# Patient Record
Sex: FEMALE | Race: BLACK OR AFRICAN AMERICAN | Employment: FULL TIME | URBAN - METROPOLITAN AREA
[De-identification: names, ages, dates, MRNs, and addresses within clinical notes are randomized per-mention and may not be internally consistent; named-entity substitution may affect disease eponyms.]

---

## 2019-01-03 ENCOUNTER — HOSPITAL ENCOUNTER (EMERGENCY)
Facility: HOSPITAL | Age: 44
Discharge: HOME | End: 2019-01-04
Attending: EMERGENCY MEDICINE
Payer: COMMERCIAL
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
## 2019-01-03 DIAGNOSIS — S13.4XXA WHIPLASH INJURY TO NECK, INITIAL ENCOUNTER: ICD-10-CM

## 2019-01-03 PROCEDURE — 99283 EMERGENCY DEPT VISIT LOW MDM: CPT

## 2019-01-03 RX ORDER — LISINOPRIL 2.5 MG/1
2.5 TABLET ORAL DAILY
COMMUNITY

## 2019-01-03 RX ORDER — SIMVASTATIN 20 MG
20 TABLET ORAL NIGHTLY
COMMUNITY

## 2019-01-04 VITALS
OXYGEN SATURATION: 99 % | SYSTOLIC BLOOD PRESSURE: 111 MMHG | HEART RATE: 78 BPM | TEMPERATURE: 98 F | DIASTOLIC BLOOD PRESSURE: 62 MMHG | WEIGHT: 175 LBS | HEIGHT: 66 IN | RESPIRATION RATE: 14 BRPM | BODY MASS INDEX: 28.13 KG/M2

## 2019-01-04 RX ORDER — LIDOCAINE 50 MG/G
1 PATCH TOPICAL EVERY 24 HOURS
Qty: 7 PATCH | Refills: 0 | Status: SHIPPED | OUTPATIENT
Start: 2019-01-04 | End: 2019-01-11

## 2019-01-04 RX ORDER — ORPHENADRINE CITRATE 100 MG/1
100 TABLET, EXTENDED RELEASE ORAL 2 TIMES DAILY
Qty: 20 TABLET | Refills: 0 | Status: SHIPPED | OUTPATIENT
Start: 2019-01-04 | End: 2019-01-14

## 2019-01-04 RX ORDER — LIDOCAINE 50 MG/G
1 PATCH TOPICAL ONCE
Status: DISCONTINUED | OUTPATIENT
Start: 2019-01-04 | End: 2019-01-04 | Stop reason: ALTCHOICE
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
## 2019-01-04 NOTE — ED NOTES
Assumed care of ambulatory female patient from triage. A&Ox4. See triage note and nursing assessment. Patient does not have any pain on palpation to cervical spine. All extremity strength and tone equal. No LOC. No headache. No vomiting.  Awaiting MD to see
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
## 2022-04-20 ENCOUNTER — TELEPHONE (OUTPATIENT)
Dept: FAMILY MEDICINE CLINIC | Facility: CLINIC | Age: 47
End: 2022-04-20

## 2022-04-20 NOTE — TELEPHONE ENCOUNTER
Advised patient to contact old prescriber for refills until she can establish care. She said she would. Please call patient back to schedule appointment.

## 2022-04-20 NOTE — TELEPHONE ENCOUNTER
This patient is trying to establish care with Dr. Feroz Arreguin she is a diabetic and is out of medication. Dr. Feroz Arreguin has no openings.

## 2022-05-12 ENCOUNTER — OFFICE VISIT (OUTPATIENT)
Dept: FAMILY MEDICINE CLINIC | Facility: CLINIC | Age: 47
End: 2022-05-12
Payer: COMMERCIAL

## 2022-05-12 ENCOUNTER — TELEPHONE (OUTPATIENT)
Dept: FAMILY MEDICINE CLINIC | Facility: CLINIC | Age: 47
End: 2022-05-12

## 2022-05-12 VITALS
TEMPERATURE: 98 F | DIASTOLIC BLOOD PRESSURE: 68 MMHG | OXYGEN SATURATION: 99 % | WEIGHT: 189 LBS | BODY MASS INDEX: 30.02 KG/M2 | RESPIRATION RATE: 18 BRPM | HEART RATE: 78 BPM | SYSTOLIC BLOOD PRESSURE: 98 MMHG | HEIGHT: 66.5 IN

## 2022-05-12 DIAGNOSIS — N30.00 ACUTE CYSTITIS WITHOUT HEMATURIA: ICD-10-CM

## 2022-05-12 DIAGNOSIS — Z12.31 ENCOUNTER FOR SCREENING MAMMOGRAM FOR MALIGNANT NEOPLASM OF BREAST: ICD-10-CM

## 2022-05-12 DIAGNOSIS — Z79.4 TYPE 2 DIABETES MELLITUS WITH HYPERGLYCEMIA, WITH LONG-TERM CURRENT USE OF INSULIN (HCC): ICD-10-CM

## 2022-05-12 DIAGNOSIS — Z12.11 SCREENING FOR COLON CANCER: ICD-10-CM

## 2022-05-12 DIAGNOSIS — N95.0 POSTMENOPAUSAL BLEEDING: ICD-10-CM

## 2022-05-12 DIAGNOSIS — Z13.0 SCREENING, ANEMIA, DEFICIENCY, IRON: ICD-10-CM

## 2022-05-12 DIAGNOSIS — Z13.29 SCREENING FOR THYROID DISORDER: ICD-10-CM

## 2022-05-12 DIAGNOSIS — Z11.59 ENCOUNTER FOR HEPATITIS C SCREENING TEST FOR LOW RISK PATIENT: ICD-10-CM

## 2022-05-12 DIAGNOSIS — I10 ESSENTIAL HYPERTENSION, BENIGN: ICD-10-CM

## 2022-05-12 DIAGNOSIS — R82.90 CLOUDY URINE: ICD-10-CM

## 2022-05-12 DIAGNOSIS — E11.65 TYPE 2 DIABETES MELLITUS WITH HYPERGLYCEMIA, WITH LONG-TERM CURRENT USE OF INSULIN (HCC): ICD-10-CM

## 2022-05-12 DIAGNOSIS — Z00.00 WELLNESS EXAMINATION: Primary | ICD-10-CM

## 2022-05-12 PROBLEM — K21.9 GASTROESOPHAGEAL REFLUX DISEASE WITHOUT ESOPHAGITIS: Status: ACTIVE | Noted: 2022-05-12

## 2022-05-12 LAB
BILIRUBIN: NEGATIVE
CREAT UR-SCNC: 211 MG/DL
GLUCOSE (URINE DIPSTICK): NEGATIVE MG/DL
KETONES (URINE DIPSTICK): NEGATIVE MG/DL
MICROALBUMIN UR-MCNC: 11.9 MG/DL
MICROALBUMIN/CREAT 24H UR-RTO: 56.4 UG/MG (ref ?–30)
MULTISTIX LOT#: ABNORMAL NUMERIC
NITRITE, URINE: POSITIVE
PH, URINE: 6 (ref 4.5–8)
PROTEIN (URINE DIPSTICK): 30 MG/DL
SPECIFIC GRAVITY: 1.02 (ref 1–1.03)
URINE-COLOR: YELLOW
UROBILINOGEN,SEMI-QN: 0.2 MG/DL (ref 0–1.9)

## 2022-05-12 PROCEDURE — 87077 CULTURE AEROBIC IDENTIFY: CPT | Performed by: FAMILY MEDICINE

## 2022-05-12 PROCEDURE — 3078F DIAST BP <80 MM HG: CPT | Performed by: FAMILY MEDICINE

## 2022-05-12 PROCEDURE — 82570 ASSAY OF URINE CREATININE: CPT | Performed by: FAMILY MEDICINE

## 2022-05-12 PROCEDURE — 87086 URINE CULTURE/COLONY COUNT: CPT | Performed by: FAMILY MEDICINE

## 2022-05-12 PROCEDURE — 99386 PREV VISIT NEW AGE 40-64: CPT | Performed by: FAMILY MEDICINE

## 2022-05-12 PROCEDURE — 99204 OFFICE O/P NEW MOD 45 MIN: CPT | Performed by: FAMILY MEDICINE

## 2022-05-12 PROCEDURE — 82043 UR ALBUMIN QUANTITATIVE: CPT | Performed by: FAMILY MEDICINE

## 2022-05-12 PROCEDURE — 81003 URINALYSIS AUTO W/O SCOPE: CPT | Performed by: FAMILY MEDICINE

## 2022-05-12 PROCEDURE — 3060F POS MICROALBUMINURIA REV: CPT | Performed by: FAMILY MEDICINE

## 2022-05-12 PROCEDURE — 3074F SYST BP LT 130 MM HG: CPT | Performed by: FAMILY MEDICINE

## 2022-05-12 PROCEDURE — 3008F BODY MASS INDEX DOCD: CPT | Performed by: FAMILY MEDICINE

## 2022-05-12 PROCEDURE — 87186 SC STD MICRODIL/AGAR DIL: CPT | Performed by: FAMILY MEDICINE

## 2022-05-12 RX ORDER — SIMVASTATIN 20 MG
20 TABLET ORAL NIGHTLY
Qty: 90 TABLET | Refills: 0 | Status: SHIPPED | OUTPATIENT
Start: 2022-05-12

## 2022-05-12 RX ORDER — NITROFURANTOIN 25; 75 MG/1; MG/1
100 CAPSULE ORAL 2 TIMES DAILY
Qty: 10 CAPSULE | Refills: 0 | Status: SHIPPED | OUTPATIENT
Start: 2022-05-12

## 2022-05-12 RX ORDER — LISINOPRIL 2.5 MG/1
2.5 TABLET ORAL DAILY
Qty: 90 TABLET | Refills: 0 | Status: SHIPPED | OUTPATIENT
Start: 2022-05-12

## 2022-05-12 RX ORDER — INSULIN GLARGINE 100 [IU]/ML
50 INJECTION, SOLUTION SUBCUTANEOUS NIGHTLY
Qty: 45 ML | Refills: 0 | Status: SHIPPED | OUTPATIENT
Start: 2022-05-12

## 2022-05-12 NOTE — PATIENT INSTRUCTIONS
Take the Macrobid 100 mg one capsule twice daily for 5 days. Drink 64 ounces of water daily. Limit caffeine intake. Empty your bladder after sex. No bubble baths, hot tubs, swimming while symptomatic. A urine culture was sent and we will contact you if any adjustments in your medication needs to be done based on the culture results. Otherwise, take all your medications until completed. Continue all your other medications as prescribed. Go for your fasting blood tests. Do not eat or drink except for water for at least 8 hours prior to the blood tests. Schedule your mammogram as ordered. Schedule your pelvic ultrasound. The Cologuard test will be mailed to your home. Please follow the directions and mail back. Make an appointment with the gynecologist, Dr. David Cardona or one of her partners, for follow up on your vaginal bleeding and to get your IUD removed. She can do your pap smear as well. Make an appointment with the diabetic clinic for better management of your diabetes. Make an appointment with the eye doctor, Dr. Letty Jessica, for your eye exam.    Recommendations for exercise are 3-5 times weekly for 30-60 minutes for a minimum of 150-300 minutes. For premenopausal women and men, 1000 mg of calcium daily is recommended. For postmenopausal women, 1200 mg of calcium daily is recommended. To help the body absorb and use calcium, vitamin D 800-1000 international units daily is recommended. See me in 3 months.

## 2022-05-31 ENCOUNTER — HOSPITAL ENCOUNTER (OUTPATIENT)
Dept: ULTRASOUND IMAGING | Age: 47
Discharge: HOME | End: 2022-05-31
Attending: FAMILY MEDICINE
Payer: COMMERCIAL

## 2022-05-31 DIAGNOSIS — N95.0 POSTMENOPAUSAL BLEEDING: ICD-10-CM

## 2022-05-31 PROCEDURE — 76830 TRANSVAGINAL US NON-OB: CPT | Performed by: FAMILY MEDICINE

## 2022-05-31 PROCEDURE — 76856 US EXAM PELVIC COMPLETE: CPT | Performed by: FAMILY MEDICINE

## 2022-06-07 ENCOUNTER — HOSPITAL ENCOUNTER (OUTPATIENT)
Dept: MAMMOGRAPHY | Facility: HOSPITAL | Age: 47
Discharge: HOME | End: 2022-06-07
Attending: FAMILY MEDICINE
Payer: COMMERCIAL

## 2022-06-07 DIAGNOSIS — Z12.31 ENCOUNTER FOR SCREENING MAMMOGRAM FOR MALIGNANT NEOPLASM OF BREAST: ICD-10-CM

## 2022-06-07 PROCEDURE — 77063 BREAST TOMOSYNTHESIS BI: CPT | Performed by: FAMILY MEDICINE

## 2022-06-07 PROCEDURE — 77067 SCR MAMMO BI INCL CAD: CPT | Performed by: FAMILY MEDICINE

## 2022-07-23 ENCOUNTER — TELEPHONE (OUTPATIENT)
Dept: FAMILY MEDICINE CLINIC | Facility: CLINIC | Age: 47
End: 2022-07-23

## 2022-07-23 ENCOUNTER — TELEMEDICINE (OUTPATIENT)
Dept: TELEHEALTH | Age: 47
End: 2022-07-23

## 2022-07-23 DIAGNOSIS — Z53.21 PATIENT LEFT WITHOUT BEING SEEN: Primary | ICD-10-CM

## 2022-07-23 RX ORDER — POLYMYXIN B SULFATE AND TRIMETHOPRIM 1; 10000 MG/ML; [USP'U]/ML
SOLUTION OPHTHALMIC
COMMUNITY
Start: 2022-07-18

## 2022-07-23 NOTE — TELEPHONE ENCOUNTER
Patient has blurry vision, itchiness, watery eye and swelling. She is currently in [place]. This is the left eye and the right eye is starting to get itchy. She did go to a walk in clinic in [place] and was placed on an antibiotic. That did not help. This has been occurring for the last 2 weeks. I advised the patient to be seen at the urgent care in [facility]. Discussed ddx: periorbital cellulitis, conjunctivitis.      Amrik Hernandez, DO

## 2022-07-28 ENCOUNTER — TELEPHONE (OUTPATIENT)
Dept: FAMILY MEDICINE CLINIC | Facility: CLINIC | Age: 47
End: 2022-07-28

## 2022-07-28 NOTE — TELEPHONE ENCOUNTER
Spoke with patient. Advised patient that she has overdue labs and should have them completed before upcoming appointment on 8/12/22. Patient verbalized understanding of info given.

## 2022-08-10 PROCEDURE — 3046F HEMOGLOBIN A1C LEVEL >9.0%: CPT | Performed by: FAMILY MEDICINE

## 2022-08-11 LAB
ABSOLUTE BASOPHILS: 48 CELLS/UL (ref 0–200)
ABSOLUTE EOSINOPHILS: 186 CELLS/UL (ref 15–500)
ABSOLUTE LYMPHOCYTES: 2739 CELLS/UL (ref 850–3900)
ABSOLUTE MONOCYTES: 317 CELLS/UL (ref 200–950)
ABSOLUTE NEUTROPHILS: 3609 CELLS/UL (ref 1500–7800)
ALBUMIN/GLOBULIN RATIO: 1.2 (CALC) (ref 1–2.5)
ALBUMIN: 3.7 G/DL (ref 3.6–5.1)
ALKALINE PHOSPHATASE: 56 U/L (ref 31–125)
ALT: 10 U/L (ref 6–29)
AST: 11 U/L (ref 10–35)
BASOPHILS: 0.7 %
BILIRUBIN, TOTAL: 0.3 MG/DL (ref 0.2–1.2)
BUN: 15 MG/DL (ref 7–25)
CALCIUM: 8.9 MG/DL (ref 8.6–10.2)
CARBON DIOXIDE: 25 MMOL/L (ref 20–32)
CHLORIDE: 105 MMOL/L (ref 98–110)
CHOL/HDLC RATIO: 5.3 (CALC)
CHOLESTEROL, TOTAL: 227 MG/DL
CREATININE: 0.9 MG/DL (ref 0.5–0.99)
EGFR: 79 ML/MIN/1.73M2
EOSINOPHILS: 2.7 %
GLOBULIN: 3.1 G/DL (CALC) (ref 1.9–3.7)
GLUCOSE: 308 MG/DL (ref 65–99)
HDL CHOLESTEROL: 43 MG/DL
HEMATOCRIT: 36.4 % (ref 35–45)
HEMOGLOBIN A1C: 10.4 % OF TOTAL HGB
HEMOGLOBIN: 11.7 G/DL (ref 11.7–15.5)
LDL-CHOLESTEROL: 162 MG/DL (CALC)
LYMPHOCYTES: 39.7 %
MCH: 27.1 PG (ref 27–33)
MCHC: 32.1 G/DL (ref 32–36)
MCV: 84.5 FL (ref 80–100)
MONOCYTES: 4.6 %
MPV: 11.3 FL (ref 7.5–12.5)
NEUTROPHILS: 52.3 %
NON-HDL CHOLESTEROL: 184 MG/DL (CALC)
PLATELET COUNT: 290 THOUSAND/UL (ref 140–400)
POTASSIUM: 4.4 MMOL/L (ref 3.5–5.3)
PROTEIN, TOTAL: 6.8 G/DL (ref 6.1–8.1)
RDW: 12.9 % (ref 11–15)
RED BLOOD CELL COUNT: 4.31 MILLION/UL (ref 3.8–5.1)
SIGNAL TO CUT-OFF: 0.06
SODIUM: 138 MMOL/L (ref 135–146)
TRIGLYCERIDES: 105 MG/DL
TSH W/REFLEX TO FT4: 1.45 MIU/L
WHITE BLOOD CELL COUNT: 6.9 THOUSAND/UL (ref 3.8–10.8)

## 2022-08-12 ENCOUNTER — OFFICE VISIT (OUTPATIENT)
Dept: FAMILY MEDICINE CLINIC | Facility: CLINIC | Age: 47
End: 2022-08-12
Payer: COMMERCIAL

## 2022-08-12 VITALS
WEIGHT: 194 LBS | TEMPERATURE: 98 F | DIASTOLIC BLOOD PRESSURE: 60 MMHG | HEART RATE: 75 BPM | BODY MASS INDEX: 30.81 KG/M2 | SYSTOLIC BLOOD PRESSURE: 98 MMHG | RESPIRATION RATE: 18 BRPM | OXYGEN SATURATION: 100 % | HEIGHT: 66.5 IN

## 2022-08-12 DIAGNOSIS — N95.0 POSTMENOPAUSAL BLEEDING: ICD-10-CM

## 2022-08-12 DIAGNOSIS — Z12.11 SCREENING FOR COLON CANCER: ICD-10-CM

## 2022-08-12 DIAGNOSIS — E11.65 TYPE 2 DIABETES MELLITUS WITH HYPERGLYCEMIA, WITH LONG-TERM CURRENT USE OF INSULIN (HCC): Primary | ICD-10-CM

## 2022-08-12 DIAGNOSIS — J30.2 SEASONAL ALLERGIC RHINITIS, UNSPECIFIED TRIGGER: ICD-10-CM

## 2022-08-12 DIAGNOSIS — H10.13 ALLERGIC CONJUNCTIVITIS OF BOTH EYES: ICD-10-CM

## 2022-08-12 DIAGNOSIS — Z79.4 TYPE 2 DIABETES MELLITUS WITH HYPERGLYCEMIA, WITH LONG-TERM CURRENT USE OF INSULIN (HCC): Primary | ICD-10-CM

## 2022-08-12 DIAGNOSIS — I10 ESSENTIAL HYPERTENSION, BENIGN: ICD-10-CM

## 2022-08-12 DIAGNOSIS — Z79.4 TYPE 2 DIABETES MELLITUS WITH HYPERGLYCEMIA, WITH LONG-TERM CURRENT USE OF INSULIN (HCC): ICD-10-CM

## 2022-08-12 DIAGNOSIS — E11.65 TYPE 2 DIABETES MELLITUS WITH HYPERGLYCEMIA, WITH LONG-TERM CURRENT USE OF INSULIN (HCC): ICD-10-CM

## 2022-08-12 PROBLEM — E78.00 HYPERCHOLESTEROLEMIA: Status: ACTIVE | Noted: 2022-08-12

## 2022-08-12 PROCEDURE — 3074F SYST BP LT 130 MM HG: CPT | Performed by: FAMILY MEDICINE

## 2022-08-12 PROCEDURE — 3078F DIAST BP <80 MM HG: CPT | Performed by: FAMILY MEDICINE

## 2022-08-12 PROCEDURE — 99215 OFFICE O/P EST HI 40 MIN: CPT | Performed by: FAMILY MEDICINE

## 2022-08-12 PROCEDURE — 3008F BODY MASS INDEX DOCD: CPT | Performed by: FAMILY MEDICINE

## 2022-08-12 RX ORDER — LISINOPRIL 2.5 MG/1
TABLET ORAL
Qty: 90 TABLET | Refills: 0 | Status: SHIPPED | OUTPATIENT
Start: 2022-08-12

## 2022-08-12 RX ORDER — FLUTICASONE PROPIONATE 50 MCG
SPRAY, SUSPENSION (ML) NASAL
Qty: 1 EACH | Refills: 2 | Status: SHIPPED | OUTPATIENT
Start: 2022-08-12

## 2022-08-12 RX ORDER — OLOPATADINE HYDROCHLORIDE 2 MG/ML
SOLUTION/ DROPS OPHTHALMIC
Qty: 2.5 ML | Refills: 2 | Status: SHIPPED | OUTPATIENT
Start: 2022-08-12

## 2022-08-12 RX ORDER — GLIPIZIDE 10 MG/1
10 TABLET ORAL DAILY
COMMUNITY

## 2022-08-12 NOTE — PATIENT INSTRUCTIONS
Make your appointment with the diabetic clinic. Make your appointment with the eye doctor, Dr. Betzy Hoff for your diabetic eye exam.    Make your appointment with the gynecologist for further evaluation of your postmenopausal bleeding and IUD removal.    Check your blood sugars at least twice weekly. Take the Metformin 1000 mg with breakfast and dinner. Take the Glipizide 10 mg with breakfast.    Continue your insulin 50 mg nightly. Continue your Simvastatin 20 mg nightly. Continue your Lisinopril 2.5 mg daily. Get your COVID booster. See me in 2 months after you have been seen at the diabetic clinic.

## 2022-09-06 ENCOUNTER — TELEPHONE (OUTPATIENT)
Dept: FAMILY MEDICINE CLINIC | Facility: CLINIC | Age: 47
End: 2022-09-06

## 2022-09-06 DIAGNOSIS — E11.65 TYPE 2 DIABETES MELLITUS WITH HYPERGLYCEMIA, WITH LONG-TERM CURRENT USE OF INSULIN (HCC): Primary | ICD-10-CM

## 2022-09-06 DIAGNOSIS — Z79.4 TYPE 2 DIABETES MELLITUS WITH HYPERGLYCEMIA, WITH LONG-TERM CURRENT USE OF INSULIN (HCC): Primary | ICD-10-CM

## 2022-09-06 NOTE — TELEPHONE ENCOUNTER
Patient misplaced referrals and is requesting have them re done   She states her eyes have been bothering her and wanted to get this done

## 2022-09-13 ENCOUNTER — TELEPHONE (OUTPATIENT)
Dept: FAMILY MEDICINE CLINIC | Facility: CLINIC | Age: 47
End: 2022-09-13

## 2022-09-13 NOTE — TELEPHONE ENCOUNTER
Received medical records from Dr. John Yanez for date of service on 9/12/2022. Diagnosis:  1. 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    The patient is referred to a retinal specialist for evaluation and treatment.

## 2022-10-24 ENCOUNTER — TELEPHONE (OUTPATIENT)
Dept: FAMILY MEDICINE CLINIC | Facility: CLINIC | Age: 47
End: 2022-10-24

## 2022-10-24 NOTE — TELEPHONE ENCOUNTER
Please contact the patient and have her make an appointment in the next 2 weeks for recheck on her diabetes.

## 2022-10-24 NOTE — TELEPHONE ENCOUNTER
Medical records received from Dara Smith  [address] 10/14/2022    Diagnosis:    1. Proliferative diabetic retinopathy bilateral.  Had long discussion regarding need for strict blood sugar control otherwise she is at high risk of going blind. She reports that hemoglobin A1c has been in double digits for some time. Based on today's history, exam and diagnostics, the decision to treat was made. We discussed the risks, benefits and alternatives regarding treatment, patient wishes to proceed. Recommended Avastin bilateral--we will consider entering PRP as DME resolves. Patient states that she cannot start treatment now due to work engagements. I stressed that there is a high risk of continued vision loss if this is left untreated but we will arrange to start treatment ASAP. Discussed the importance of blood sugar control and prevention of ocular complications. 2.  Diabetic macular edema bilateral    3. Vitreous hemorrhage bilateral.  Recommended observation. Secondary to P  For epiretinal membrane left eye. Recommended observation. Advise regular use of Amsler grid. 5.  Floaters bilateral.    6.  Nuclear sclerosis bilateral.    7.  Diabetes type 2 with ocular complications    Patient is to follow-up in 3 to 4 weeks-Avastin right eye. 1 week after that for Avastin left eye.   PDR bilateral.

## 2022-10-27 ENCOUNTER — TELEPHONE (OUTPATIENT)
Dept: FAMILY MEDICINE CLINIC | Facility: CLINIC | Age: 47
End: 2022-10-27

## 2022-10-27 ENCOUNTER — OFFICE VISIT (OUTPATIENT)
Dept: FAMILY MEDICINE CLINIC | Facility: CLINIC | Age: 47
End: 2022-10-27
Payer: COMMERCIAL

## 2022-10-27 VITALS
TEMPERATURE: 97 F | OXYGEN SATURATION: 98 % | RESPIRATION RATE: 18 BRPM | SYSTOLIC BLOOD PRESSURE: 120 MMHG | HEART RATE: 83 BPM | BODY MASS INDEX: 30.81 KG/M2 | DIASTOLIC BLOOD PRESSURE: 80 MMHG | WEIGHT: 194 LBS | HEIGHT: 66.5 IN

## 2022-10-27 DIAGNOSIS — E78.00 HYPERCHOLESTEROLEMIA: ICD-10-CM

## 2022-10-27 DIAGNOSIS — I10 ESSENTIAL HYPERTENSION, BENIGN: ICD-10-CM

## 2022-10-27 DIAGNOSIS — Z79.4 TYPE 2 DIABETES MELLITUS WITH HYPERGLYCEMIA, WITH LONG-TERM CURRENT USE OF INSULIN (HCC): Primary | ICD-10-CM

## 2022-10-27 DIAGNOSIS — L23.9 ALLERGIC DERMATITIS: ICD-10-CM

## 2022-10-27 DIAGNOSIS — D25.1 INTRAMURAL LEIOMYOMA OF UTERUS: ICD-10-CM

## 2022-10-27 DIAGNOSIS — R35.0 URINARY FREQUENCY: ICD-10-CM

## 2022-10-27 DIAGNOSIS — E11.65 TYPE 2 DIABETES MELLITUS WITH HYPERGLYCEMIA, WITH LONG-TERM CURRENT USE OF INSULIN (HCC): Primary | ICD-10-CM

## 2022-10-27 DIAGNOSIS — Z23 NEED FOR INFLUENZA VACCINATION: ICD-10-CM

## 2022-10-27 DIAGNOSIS — E11.3593 PROLIFERATIVE DIABETIC RETINOPATHY OF BOTH EYES ASSOCIATED WITH TYPE 2 DIABETES MELLITUS, UNSPECIFIED PROLIFERATIVE RETINOPATHY TYPE (HCC): ICD-10-CM

## 2022-10-27 DIAGNOSIS — E11.41 DIABETIC MONONEUROPATHY ASSOCIATED WITH TYPE 2 DIABETES MELLITUS (HCC): ICD-10-CM

## 2022-10-27 LAB
APPEARANCE: CLEAR
BILIRUBIN: NEGATIVE
CREAT UR-SCNC: 171 MG/DL
GLUCOSE (URINE DIPSTICK): NEGATIVE MG/DL
KETONES (URINE DIPSTICK): NEGATIVE MG/DL
MICROALBUMIN UR-MCNC: 8.65 MG/DL
MICROALBUMIN/CREAT 24H UR-RTO: 50.6 UG/MG (ref ?–30)
MULTISTIX LOT#: ABNORMAL NUMERIC
NITRITE, URINE: NEGATIVE
PH, URINE: 7 (ref 4.5–8)
SPECIFIC GRAVITY: 1.02 (ref 1–1.03)
URINE-COLOR: YELLOW
UROBILINOGEN,SEMI-QN: 2 MG/DL (ref 0–1.9)

## 2022-10-27 PROCEDURE — 3079F DIAST BP 80-89 MM HG: CPT | Performed by: FAMILY MEDICINE

## 2022-10-27 PROCEDURE — 99215 OFFICE O/P EST HI 40 MIN: CPT | Performed by: FAMILY MEDICINE

## 2022-10-27 PROCEDURE — 3074F SYST BP LT 130 MM HG: CPT | Performed by: FAMILY MEDICINE

## 2022-10-27 PROCEDURE — 82043 UR ALBUMIN QUANTITATIVE: CPT | Performed by: FAMILY MEDICINE

## 2022-10-27 PROCEDURE — 3008F BODY MASS INDEX DOCD: CPT | Performed by: FAMILY MEDICINE

## 2022-10-27 PROCEDURE — 81003 URINALYSIS AUTO W/O SCOPE: CPT | Performed by: FAMILY MEDICINE

## 2022-10-27 PROCEDURE — 82570 ASSAY OF URINE CREATININE: CPT | Performed by: FAMILY MEDICINE

## 2022-10-27 PROCEDURE — 87086 URINE CULTURE/COLONY COUNT: CPT | Performed by: FAMILY MEDICINE

## 2022-10-27 RX ORDER — GABAPENTIN 100 MG/1
100 CAPSULE ORAL 3 TIMES DAILY
Qty: 270 CAPSULE | Refills: 0 | Status: SHIPPED | OUTPATIENT
Start: 2022-10-27

## 2022-10-27 RX ORDER — SIMVASTATIN 40 MG
40 TABLET ORAL NIGHTLY
Qty: 90 TABLET | Refills: 1 | Status: SHIPPED | OUTPATIENT
Start: 2022-10-27

## 2022-10-27 NOTE — TELEPHONE ENCOUNTER
Pt stated that doctor ref. Her to see a gynecologist but when she called to schedule they do not take her insurance.  Pt is requesting a new referral for a gynecologist.

## 2022-10-27 NOTE — TELEPHONE ENCOUNTER
Per Mayra Dangelo with Dr. Marisabel Atkinson, no appts available until after Jan 1st, when [facility] will no longer be accepted. Patient given the names of 2 external offices to try. Instructed to call the phone number on her insurance card for a list of in-network providers if no luck with the specialist recommendations provided by this office. Sent my chart message with contact information for 2 external ob/gyn offices. Verbalized understanding.

## 2022-10-27 NOTE — PATIENT INSTRUCTIONS
Go for your fasting blood tests. Do not eat or drink except for water for at least 8 hours prior to the blood tests. Make an appointment with the diabetic clinic as soon as possible. Make an appointment with the gynecologist for follow up on your fibroid. Make an appointment with the eye doctor for treatment of your diabetic retinopathy. I have increased your Simvastatin to 40 mg daily. Start the Gabapentin 100 mg three times a daily for the numbness and tingling. Continue your medications until seen by the diabetic clinic. See me in three months.

## 2022-10-27 NOTE — ASSESSMENT & PLAN NOTE
Seen by Dr. Kyung Lucio. Referred to retinal specialist. Patient reluctant to have any procedures done.  Emphasized need for glucose control and to follow up with retinal specialist.

## 2022-11-10 DIAGNOSIS — E11.65 TYPE 2 DIABETES MELLITUS WITH HYPERGLYCEMIA, WITH LONG-TERM CURRENT USE OF INSULIN (HCC): ICD-10-CM

## 2022-11-10 DIAGNOSIS — Z79.4 TYPE 2 DIABETES MELLITUS WITH HYPERGLYCEMIA, WITH LONG-TERM CURRENT USE OF INSULIN (HCC): ICD-10-CM

## 2022-11-10 RX ORDER — INSULIN GLARGINE 100 [IU]/ML
INJECTION, SOLUTION SUBCUTANEOUS
Qty: 45 ML | Refills: 0 | Status: SHIPPED | OUTPATIENT
Start: 2022-11-10

## 2022-11-15 ENCOUNTER — TELEPHONE (OUTPATIENT)
Dept: ENDOCRINOLOGY CLINIC | Facility: CLINIC | Age: 47
End: 2022-11-15

## 2022-11-15 ENCOUNTER — OFFICE VISIT (OUTPATIENT)
Dept: ENDOCRINOLOGY CLINIC | Facility: CLINIC | Age: 47
End: 2022-11-15
Payer: COMMERCIAL

## 2022-11-15 VITALS
DIASTOLIC BLOOD PRESSURE: 78 MMHG | HEART RATE: 87 BPM | OXYGEN SATURATION: 92 % | SYSTOLIC BLOOD PRESSURE: 126 MMHG | RESPIRATION RATE: 18 BRPM | BODY MASS INDEX: 31 KG/M2 | WEIGHT: 197.63 LBS

## 2022-11-15 DIAGNOSIS — E78.00 HYPERCHOLESTEROLEMIA: ICD-10-CM

## 2022-11-15 DIAGNOSIS — I10 ESSENTIAL HYPERTENSION, BENIGN: ICD-10-CM

## 2022-11-15 DIAGNOSIS — Z79.4 TYPE 2 DIABETES MELLITUS WITH HYPERGLYCEMIA, WITH LONG-TERM CURRENT USE OF INSULIN (HCC): Primary | ICD-10-CM

## 2022-11-15 DIAGNOSIS — E66.09 CLASS 1 OBESITY DUE TO EXCESS CALORIES WITH SERIOUS COMORBIDITY AND BODY MASS INDEX (BMI) OF 31.0 TO 31.9 IN ADULT: ICD-10-CM

## 2022-11-15 DIAGNOSIS — E11.65 TYPE 2 DIABETES MELLITUS WITH HYPERGLYCEMIA, WITH LONG-TERM CURRENT USE OF INSULIN (HCC): Primary | ICD-10-CM

## 2022-11-15 LAB
CARTRIDGE LOT#: 524 NUMERIC
GLUCOSE BLOOD: 166
HEMOGLOBIN A1C: 8.6 % (ref 4.3–5.6)
TEST STRIP LOT #: NORMAL NUMERIC

## 2022-11-15 PROCEDURE — 83036 HEMOGLOBIN GLYCOSYLATED A1C: CPT | Performed by: NURSE PRACTITIONER

## 2022-11-15 PROCEDURE — 82947 ASSAY GLUCOSE BLOOD QUANT: CPT | Performed by: NURSE PRACTITIONER

## 2022-11-15 PROCEDURE — 3074F SYST BP LT 130 MM HG: CPT | Performed by: NURSE PRACTITIONER

## 2022-11-15 PROCEDURE — 99215 OFFICE O/P EST HI 40 MIN: CPT | Performed by: NURSE PRACTITIONER

## 2022-11-15 PROCEDURE — 3078F DIAST BP <80 MM HG: CPT | Performed by: NURSE PRACTITIONER

## 2022-11-15 RX ORDER — SEMAGLUTIDE 1.34 MG/ML
0.5 INJECTION, SOLUTION SUBCUTANEOUS WEEKLY
Qty: 1.5 ML | Refills: 0 | COMMUNITY
Start: 2022-11-15

## 2022-11-15 RX ORDER — SEMAGLUTIDE 1.34 MG/ML
0.5 INJECTION, SOLUTION SUBCUTANEOUS WEEKLY
Qty: 4.5 ML | Refills: 1 | Status: SHIPPED | OUTPATIENT
Start: 2022-11-15

## 2022-11-15 RX ORDER — BLOOD-GLUCOSE SENSOR
1 EACH MISCELLANEOUS
Qty: 1 EACH | Refills: 0 | Status: SHIPPED | OUTPATIENT
Start: 2022-11-15

## 2022-11-15 NOTE — PROGRESS NOTES
A continuous glucose sensor for 24 hour blood sugar monitoring was placed on patient today per [employer] order. Serial NUMBER: [id number] Exp date: (1/31/23)  - After cleansing skin with alcohol prep, Sensor (REJI 3)was inserted on LEFT  Posterior upper arm area without difficulty. Small amount of skin prep was added around sensor tape after placement to help with sensor adhesive. Area remains free of any bleeding or irritation or pain at site when patient left DM center. Patient instructions:   Record daily food/drink intake and activity in log book  Call Diabetes center with any questions or concerns.    instructed to record food, exercise, insulin doses (if taking) on log provided

## 2022-11-15 NOTE — ASSESSMENT & PLAN NOTE
A1C 8.6% ( last A1C 10.4%)   Diabetes control is improving but still sub optimal control     Had lengthy discussion regarding poor glycemic control. Patient was reminded  the negative impact these persistent high glucose trends have on her health & reviewed importance of better glucose control to prevent onset /progression of DM complications.

## 2022-11-16 NOTE — TELEPHONE ENCOUNTER
Started [facility] form for dexcom attached LOV notes and facesheet/insurance cards waiting for CAB to sign form

## 2022-11-17 ENCOUNTER — TELEMEDICINE (OUTPATIENT)
Dept: ENDOCRINOLOGY CLINIC | Facility: CLINIC | Age: 47
End: 2022-11-17
Payer: COMMERCIAL

## 2022-11-17 NOTE — TELEPHONE ENCOUNTER
Pt was advised at her appt that it may not be covered and her OOP cost would be $75 per month   Will notify pt via [employer]

## 2022-11-17 NOTE — TELEPHONE ENCOUNTER
Received back fax from [facility] stating pt insurance follows medicare guidelines and pt needs to be on a 3x daily injection and needing to adjust insulin due to checking 3x daily in order to process this application.  Please advise

## 2022-11-21 ENCOUNTER — TELEPHONE (OUTPATIENT)
Dept: ENDOCRINOLOGY CLINIC | Facility: CLINIC | Age: 47
End: 2022-11-21

## 2022-11-21 DIAGNOSIS — L23.9 ALLERGIC DERMATITIS: ICD-10-CM

## 2022-11-21 RX ORDER — GLIPIZIDE 5 MG/1
5 TABLET, FILM COATED, EXTENDED RELEASE ORAL EVERY MORNING
Qty: 30 TABLET | Refills: 0 | Status: SHIPPED | OUTPATIENT
Start: 2022-11-21

## 2022-11-21 NOTE — TELEPHONE ENCOUNTER
Orders Placed This Encounter      glipiZIDE ER 5 MG Oral Tablet 24 Hr          Sig: Take 1 tablet (5 mg total) by mouth every morning.           Dispense:  30 tablet          Refill:  0

## 2022-11-22 NOTE — TELEPHONE ENCOUNTER
Patient requesting ointment instead of cream. Pended oint.       Last fill for cream was 10/27/22    FOV 1/27/23

## 2022-12-08 ENCOUNTER — TELEPHONE (OUTPATIENT)
Dept: FAMILY MEDICINE CLINIC | Facility: CLINIC | Age: 47
End: 2022-12-08

## 2022-12-08 NOTE — TELEPHONE ENCOUNTER
Spoke to patient. [place] is in [place]. She feels she needs Restasis eye gtts.  Advised this prescription need to come from an ophthalmologist.

## 2022-12-08 NOTE — TELEPHONE ENCOUNTER
Patient calling is c/o dry eye also watering pt friend has given her rx Retasis to use and helped with these symptoms    Please send to  Saud Estrada

## 2023-01-11 RX ORDER — BLOOD-GLUCOSE SENSOR
EACH MISCELLANEOUS
Qty: 1 EACH | Refills: 0 | Status: SHIPPED | OUTPATIENT
Start: 2023-01-11

## 2023-01-19 ENCOUNTER — OFFICE VISIT (OUTPATIENT)
Dept: FAMILY MEDICINE CLINIC | Facility: CLINIC | Age: 48
End: 2023-01-19

## 2023-01-19 ENCOUNTER — TELEPHONE (OUTPATIENT)
Dept: ENDOCRINOLOGY CLINIC | Facility: CLINIC | Age: 48
End: 2023-01-19

## 2023-01-19 VITALS
BODY MASS INDEX: 28.59 KG/M2 | HEIGHT: 66.5 IN | SYSTOLIC BLOOD PRESSURE: 98 MMHG | OXYGEN SATURATION: 100 % | RESPIRATION RATE: 18 BRPM | TEMPERATURE: 98 F | DIASTOLIC BLOOD PRESSURE: 60 MMHG | WEIGHT: 180 LBS | HEART RATE: 88 BPM

## 2023-01-19 DIAGNOSIS — E78.00 HYPERCHOLESTEROLEMIA: ICD-10-CM

## 2023-01-19 DIAGNOSIS — I10 ESSENTIAL HYPERTENSION, BENIGN: Primary | ICD-10-CM

## 2023-01-19 DIAGNOSIS — E11.41 DIABETIC MONONEUROPATHY ASSOCIATED WITH TYPE 2 DIABETES MELLITUS (HCC): ICD-10-CM

## 2023-01-19 DIAGNOSIS — E11.3593 PROLIFERATIVE DIABETIC RETINOPATHY OF BOTH EYES ASSOCIATED WITH TYPE 2 DIABETES MELLITUS, UNSPECIFIED PROLIFERATIVE RETINOPATHY TYPE (HCC): ICD-10-CM

## 2023-01-19 RX ORDER — BLOOD-GLUCOSE SENSOR
1 EACH MISCELLANEOUS
Qty: 2 EACH | Refills: 3 | Status: SHIPPED | OUTPATIENT
Start: 2023-01-19

## 2023-01-19 RX ORDER — SEMAGLUTIDE 1.34 MG/ML
0.5 INJECTION, SOLUTION SUBCUTANEOUS WEEKLY
Qty: 4.5 ML | Refills: 1 | Status: SHIPPED | OUTPATIENT
Start: 2023-01-19

## 2023-01-19 NOTE — PATIENT INSTRUCTIONS
Continue your medications as prescribed. Go for your fasting blood tests at [facility], [address] [place] [address]. This site charges less for their blood tests. Once your insurance situation has been settled, follow up with your eye doctor for your injection.     See me in May for your annual physical.

## 2023-01-19 NOTE — TELEPHONE ENCOUNTER
Contacted pt today bc she had an appt with PCP and M provider and can't see both same day. When I spoke with pt she mentioned her insurance changed to [facility] which we no longer take. Pt was aware we do not take insurance after visit with PCP. She will work with [employer] to [employer] and contact the office when she has resolved issue. Pt requesting refill on Rosamaria 3 and states PCP is ordering Ozempic.

## 2023-01-24 ENCOUNTER — TELEPHONE (OUTPATIENT)
Dept: FAMILY MEDICINE CLINIC | Facility: CLINIC | Age: 48
End: 2023-01-24

## 2023-01-24 NOTE — TELEPHONE ENCOUNTER
Phoned [facility] desk to initiate PA for Josph Reason. [phone]. Plan states they do not complete her PA. Patient was self pay at last OV. Attempted to call patient. Mailbox full. PA not initiated at this time.

## 2023-02-16 DIAGNOSIS — E11.41 DIABETIC MONONEUROPATHY ASSOCIATED WITH TYPE 2 DIABETES MELLITUS (HCC): ICD-10-CM

## 2023-02-20 RX ORDER — GABAPENTIN 100 MG/1
CAPSULE ORAL
Qty: 270 CAPSULE | Refills: 0 | Status: SHIPPED | OUTPATIENT
Start: 2023-02-20

## 2024-03-11 ENCOUNTER — TELEPHONE (OUTPATIENT)
Dept: FAMILY MEDICINE CLINIC | Facility: CLINIC | Age: 49
End: 2024-03-11

## 2024-03-11 ENCOUNTER — PATIENT OUTREACH (OUTPATIENT)
Dept: CASE MANAGEMENT | Age: 49
End: 2024-03-11

## 2024-03-11 NOTE — PROCEDURES
The office order for PCP removal request is Approved and finalized on March 11, 2024.    Thanks,  [facility] Team

## 2024-03-11 NOTE — TELEPHONE ENCOUNTER
Spoke with patient. Patient is established with [facility] and being seen there for her care. Please remove patient from gap report. Verified through care everywhere.

## 2024-12-03 ENCOUNTER — EXTERNAL LAB (OUTPATIENT)
Dept: HEALTH INFORMATION MANAGEMENT | Facility: OTHER | Age: 49
End: 2024-12-03

## 2024-12-03 LAB
25(OH)D3+25(OH)D2 SERPL-MCNC: 59.3 NG/ML (ref 30–100)
ALBUMIN SERPL-MCNC: 4 G/DL (ref 3.5–5.7)
ALP SERPL-CCNC: 46 U/L (ref 34–104)
ALT SERPL-CCNC: 11 U/L (ref 7–52)
ANION GAP SERPL CALC-SCNC: ABNORMAL MEQ/L (ref 6–15)
AST SERPL-CCNC: 14 U/L (ref 13–39)
BASOPHILS # BLD: 0.1 K/CMM (ref 0–0.2)
BASOPHILS NFR BLD: 1 %
BILIRUB SERPL-MCNC: 0.2 MG/DL (ref 0.2–0.8)
BUN SERPL-MCNC: 19 MG/DL (ref 6–26)
CALCIUM SERPL-MCNC: 9.9 MG/DL (ref 8.6–10.3)
CHLORIDE SERPL-SCNC: 105 MEQ/L (ref 98–107)
CHOLEST SERPL-MCNC: 194 MG/DL
CHOLEST/HDLC SERPL: 4 RATIO (ref 0–5)
CO2 SERPL-SCNC: 29 MEQ/L (ref 21–31)
CREAT SERPL-MCNC: 1 MG/DL (ref 0.6–1.2)
EOSINOPHIL # BLD: 0.4 K/CMM (ref 0–0.5)
EOSINOPHIL NFR BLD: 5 %
ERYTHROCYTE [DISTWIDTH] IN BLOOD: 13.9 % (ref 11.9–15.9)
GFR SERPLBLD SCHWARTZ-ARVRAT: 69 1.73M SQ
GLUCOSE SERPL-MCNC: 200 MG/DL (ref 70–99)
HCT VFR BLD CALC: 40 % (ref 34.7–45.1)
HDLC SERPL-MCNC: 49 MG/DL
HGB BLD-MCNC: 12.7 G/DL (ref 12–15.3)
LDLC SERPL CALC-MCNC: 132 MG/DL
LENGTH OF FAST TIME PATIENT: ABNORMAL H
LENGTH OF FAST TIME PATIENT: ABNORMAL H
LYMPHOCYTES # BLD: 3 K/CMM (ref 0.6–3.4)
LYMPHOCYTES NFR BLD: 39 %
MCH RBC QN AUTO: 27.5 PG (ref 26–34)
MCHC RBC AUTO-ENTMCNC: 31.8 G/DL (ref 32.5–35.8)
MCV RBC AUTO: 87 FL (ref 80–100)
MONOCYTES # BLD: 0.4 K/CMM (ref 0.3–1)
MONOCYTES NFR BLD: 5 %
NEUTROPHILS # BLD: 3.8 K/CMM (ref 1.8–7.5)
NEUTROPHILS NFR BLD: 50 %
NONHDLC SERPL-MCNC: 145 MG/DL
PLATELET # BLD: 287 K/CMM (ref 150–450)
POTASSIUM SERPL-SCNC: 4.3 MEQ/L (ref 3.5–5.2)
PROT SERPL-MCNC: 7.9 G/DL (ref 6.4–8.9)
RBC # BLD: 4.63 M/CMM (ref 3.63–5.04)
SODIUM SERPL-SCNC: 141 MEQ/L (ref 133–144)
T4 FREE SERPL-MCNC: 0.96 NG/DL (ref 0.55–1.6)
TRIGL SERPL-MCNC: 67 MG/DL
TSH SERPL-ACNC: 1.13 UIU/ML (ref 0.27–4.2)
VIT B12 SERPL-MCNC: >1500 PG/ML (ref 180–914)
VLDLC SERPL CALC-MCNC: 13 MG/DL (ref 5–30)
WBC # BLD: 7.7 K/CMM (ref 4–11)

## 2024-12-05 RX ORDER — DAPAGLIFLOZIN 5 MG/1
5 TABLET, FILM COATED ORAL
COMMUNITY

## 2024-12-05 RX ORDER — SIMVASTATIN 40 MG
40 TABLET ORAL NIGHTLY
COMMUNITY

## 2024-12-05 RX ORDER — LISINOPRIL 10 MG/1
10 TABLET ORAL DAILY
COMMUNITY

## 2024-12-11 ENCOUNTER — ANESTHESIA EVENT (OUTPATIENT)
Dept: SURGERY | Age: 49
End: 2024-12-11

## 2024-12-11 SDOH — SOCIAL STABILITY: SOCIAL INSECURITY: HOW OFTEN DOES ANYONE, INCLUDING FAMILY AND FRIENDS, PHYSICALLY HURT YOU?: NEVER

## 2024-12-11 ASSESSMENT — ACTIVITIES OF DAILY LIVING (ADL)
ADL_SHORT_OF_BREATH: NO
NEEDS_ASSIST: NO
HISTORY OF FALLING IN THE LAST YEAR (PRIOR TO ADMISSION): NO
CHRONIC_PAIN_PRESENT: NO
SENSORY_SUPPORT_DEVICES: EYEGLASSES

## 2024-12-12 ENCOUNTER — HOSPITAL ENCOUNTER (OUTPATIENT)
Age: 49
Discharge: HOME | End: 2024-12-12
Attending: STUDENT IN AN ORGANIZED HEALTH CARE EDUCATION/TRAINING PROGRAM | Admitting: STUDENT IN AN ORGANIZED HEALTH CARE EDUCATION/TRAINING PROGRAM

## 2024-12-12 ENCOUNTER — ANESTHESIA (OUTPATIENT)
Dept: SURGERY | Age: 49
End: 2024-12-12

## 2024-12-12 VITALS
TEMPERATURE: 97.7 F | HEIGHT: 66 IN | BODY MASS INDEX: 28.13 KG/M2 | OXYGEN SATURATION: 98 % | WEIGHT: 175.04 LBS | SYSTOLIC BLOOD PRESSURE: 131 MMHG | RESPIRATION RATE: 18 BRPM | HEART RATE: 74 BPM | DIASTOLIC BLOOD PRESSURE: 59 MMHG

## 2024-12-12 LAB
B-HCG UR QL: NEGATIVE
GLUCOSE BLDC GLUCOMTR-MCNC: 131 MG/DL (ref 70–99)
GLUCOSE BLDC GLUCOMTR-MCNC: 97 MG/DL (ref 70–99)
INTERNAL PROCEDURAL CONTROLS ACCEPTABLE: YES
TEST LOT EXPIRATION DATE: NORMAL
TEST LOT NUMBER: NORMAL

## 2024-12-12 PROCEDURE — 10002801 HB RX 250 W/O HCPCS: Performed by: ANESTHESIOLOGY

## 2024-12-12 PROCEDURE — 10002800 HB RX 250 W HCPCS: Performed by: STUDENT IN AN ORGANIZED HEALTH CARE EDUCATION/TRAINING PROGRAM

## 2024-12-12 PROCEDURE — 10006023 HB SUPPLY 272: Performed by: STUDENT IN AN ORGANIZED HEALTH CARE EDUCATION/TRAINING PROGRAM

## 2024-12-12 PROCEDURE — 13000007 HB ANESTHESIA MAC EA ADD MINUTE: Performed by: STUDENT IN AN ORGANIZED HEALTH CARE EDUCATION/TRAINING PROGRAM

## 2024-12-12 PROCEDURE — 10002807 HB RX 258: Performed by: ANESTHESIOLOGY

## 2024-12-12 PROCEDURE — 10002803 HB RX 637

## 2024-12-12 PROCEDURE — 13000039 HB MAJOR COMPLEX CASE EA ADD MINUTE: Performed by: STUDENT IN AN ORGANIZED HEALTH CARE EDUCATION/TRAINING PROGRAM

## 2024-12-12 PROCEDURE — 10002803 HB RX 637: Performed by: STUDENT IN AN ORGANIZED HEALTH CARE EDUCATION/TRAINING PROGRAM

## 2024-12-12 PROCEDURE — 10004451 HB PACU RECOVERY 1ST 30 MINUTES: Performed by: STUDENT IN AN ORGANIZED HEALTH CARE EDUCATION/TRAINING PROGRAM

## 2024-12-12 PROCEDURE — 13000006 HB ANESTHESIA MAC S/U + 1ST 15 MIN: Performed by: STUDENT IN AN ORGANIZED HEALTH CARE EDUCATION/TRAINING PROGRAM

## 2024-12-12 PROCEDURE — 82962 GLUCOSE BLOOD TEST: CPT

## 2024-12-12 PROCEDURE — 81025 URINE PREGNANCY TEST: CPT | Performed by: ANESTHESIOLOGY

## 2024-12-12 PROCEDURE — 13000038 HB MAJOR COMPLEX CASE S/U + 1ST 15 MIN: Performed by: STUDENT IN AN ORGANIZED HEALTH CARE EDUCATION/TRAINING PROGRAM

## 2024-12-12 PROCEDURE — 10002800 HB RX 250 W HCPCS: Performed by: ANESTHESIOLOGY

## 2024-12-12 PROCEDURE — 10002801 HB RX 250 W/O HCPCS

## 2024-12-12 PROCEDURE — 10004651 HB RX, NO CHARGE ITEM: Performed by: ANESTHESIOLOGY

## 2024-12-12 PROCEDURE — 10002803 HB RX 637: Performed by: ANESTHESIOLOGY

## 2024-12-12 PROCEDURE — 10002801 HB RX 250 W/O HCPCS: Performed by: STUDENT IN AN ORGANIZED HEALTH CARE EDUCATION/TRAINING PROGRAM

## 2024-12-12 PROCEDURE — 13000001 HB PHASE II RECOVERY EA 30 MINUTES: Performed by: STUDENT IN AN ORGANIZED HEALTH CARE EDUCATION/TRAINING PROGRAM

## 2024-12-12 RX ORDER — DEXTROSE MONOHYDRATE 25 G/50ML
25 INJECTION, SOLUTION INTRAVENOUS PRN
Status: DISCONTINUED | OUTPATIENT
Start: 2024-12-12 | End: 2024-12-12 | Stop reason: HOSPADM

## 2024-12-12 RX ORDER — MOXIFLOXACIN 5 MG/ML
SOLUTION/ DROPS OPHTHALMIC PRN
Status: DISCONTINUED | OUTPATIENT
Start: 2024-12-12 | End: 2024-12-12 | Stop reason: HOSPADM

## 2024-12-12 RX ORDER — SODIUM CHLORIDE, SODIUM LACTATE, POTASSIUM CHLORIDE, CALCIUM CHLORIDE 600; 310; 30; 20 MG/100ML; MG/100ML; MG/100ML; MG/100ML
INJECTION, SOLUTION INTRAVENOUS CONTINUOUS PRN
Status: DISCONTINUED | OUTPATIENT
Start: 2024-12-12 | End: 2024-12-12

## 2024-12-12 RX ORDER — NICOTINE POLACRILEX 4 MG
30 LOZENGE BUCCAL
Status: DISCONTINUED | OUTPATIENT
Start: 2024-12-12 | End: 2024-12-12 | Stop reason: HOSPADM

## 2024-12-12 RX ORDER — METOCLOPRAMIDE HYDROCHLORIDE 5 MG/ML
5 INJECTION INTRAMUSCULAR; INTRAVENOUS EVERY 6 HOURS PRN
Status: DISCONTINUED | OUTPATIENT
Start: 2024-12-12 | End: 2024-12-12 | Stop reason: HOSPADM

## 2024-12-12 RX ORDER — MIDAZOLAM HYDROCHLORIDE 1 MG/ML
INJECTION, SOLUTION INTRAMUSCULAR; INTRAVENOUS PRN
Status: DISCONTINUED | OUTPATIENT
Start: 2024-12-12 | End: 2024-12-12

## 2024-12-12 RX ORDER — BUPIVACAINE HYDROCHLORIDE 7.5 MG/ML
INJECTION, SOLUTION EPIDURAL; RETROBULBAR PRN
Status: DISCONTINUED | OUTPATIENT
Start: 2024-12-12 | End: 2024-12-12 | Stop reason: HOSPADM

## 2024-12-12 RX ORDER — OXYCODONE HYDROCHLORIDE 5 MG/1
5 TABLET ORAL
Status: DISCONTINUED | OUTPATIENT
Start: 2024-12-12 | End: 2024-12-12 | Stop reason: HOSPADM

## 2024-12-12 RX ORDER — 0.9 % SODIUM CHLORIDE 0.9 %
2 VIAL (ML) INJECTION EVERY 12 HOURS SCHEDULED
Status: DISCONTINUED | OUTPATIENT
Start: 2024-12-12 | End: 2024-12-12 | Stop reason: HOSPADM

## 2024-12-12 RX ORDER — HYDRALAZINE HYDROCHLORIDE 20 MG/ML
5 INJECTION INTRAMUSCULAR; INTRAVENOUS EVERY 10 MIN PRN
Status: DISCONTINUED | OUTPATIENT
Start: 2024-12-12 | End: 2024-12-12 | Stop reason: HOSPADM

## 2024-12-12 RX ORDER — LIDOCAINE HYDROCHLORIDE 20 MG/ML
INJECTION, SOLUTION EPIDURAL; INFILTRATION; INTRACAUDAL; PERINEURAL PRN
Status: DISCONTINUED | OUTPATIENT
Start: 2024-12-12 | End: 2024-12-12 | Stop reason: HOSPADM

## 2024-12-12 RX ORDER — ACETAMINOPHEN 650 MG/1
650 SUPPOSITORY RECTAL EVERY 4 HOURS PRN
Status: DISCONTINUED | OUTPATIENT
Start: 2024-12-12 | End: 2024-12-12 | Stop reason: HOSPADM

## 2024-12-12 RX ORDER — ATROPINE SULFATE 10 MG/ML
SOLUTION/ DROPS OPHTHALMIC PRN
Status: DISCONTINUED | OUTPATIENT
Start: 2024-12-12 | End: 2024-12-12 | Stop reason: HOSPADM

## 2024-12-12 RX ORDER — FAMOTIDINE 10 MG/ML
20 INJECTION, SOLUTION INTRAVENOUS ONCE
Status: COMPLETED | OUTPATIENT
Start: 2024-12-12 | End: 2024-12-12

## 2024-12-12 RX ORDER — IPRATROPIUM BROMIDE AND ALBUTEROL SULFATE 2.5; .5 MG/3ML; MG/3ML
3 SOLUTION RESPIRATORY (INHALATION)
Status: DISCONTINUED | OUTPATIENT
Start: 2024-12-12 | End: 2024-12-12 | Stop reason: HOSPADM

## 2024-12-12 RX ORDER — LIDOCAINE HYDROCHLORIDE 10 MG/ML
INJECTION, SOLUTION INFILTRATION; PERINEURAL PRN
Status: DISCONTINUED | OUTPATIENT
Start: 2024-12-12 | End: 2024-12-12

## 2024-12-12 RX ORDER — POVIDONE-IODINE 5 %
SOLUTION, NON-ORAL OPHTHALMIC (EYE) PRN
Status: DISCONTINUED | OUTPATIENT
Start: 2024-12-12 | End: 2024-12-12 | Stop reason: HOSPADM

## 2024-12-12 RX ORDER — PROCHLORPERAZINE EDISYLATE 5 MG/ML
5 INJECTION INTRAMUSCULAR; INTRAVENOUS EVERY 4 HOURS PRN
Status: DISCONTINUED | OUTPATIENT
Start: 2024-12-12 | End: 2024-12-12 | Stop reason: HOSPADM

## 2024-12-12 RX ORDER — CITRIC ACID/SODIUM CITRATE 334-500MG
30 SOLUTION, ORAL ORAL
Status: DISCONTINUED | OUTPATIENT
Start: 2024-12-12 | End: 2024-12-12 | Stop reason: HOSPADM

## 2024-12-12 RX ORDER — LIDOCAINE HYDROCHLORIDE 10 MG/ML
5 INJECTION, SOLUTION INFILTRATION; PERINEURAL PRN
Status: DISCONTINUED | OUTPATIENT
Start: 2024-12-12 | End: 2024-12-12 | Stop reason: HOSPADM

## 2024-12-12 RX ORDER — PROPOFOL 10 MG/ML
INJECTION, EMULSION INTRAVENOUS PRN
Status: DISCONTINUED | OUTPATIENT
Start: 2024-12-12 | End: 2024-12-12

## 2024-12-12 RX ORDER — ERYTHROMYCIN 5 MG/G
OINTMENT OPHTHALMIC PRN
Status: DISCONTINUED | OUTPATIENT
Start: 2024-12-12 | End: 2024-12-12 | Stop reason: HOSPADM

## 2024-12-12 RX ORDER — EPINEPHRINE 1 MG/ML
INJECTION, SOLUTION, CONCENTRATE INTRAVENOUS PRN
Status: DISCONTINUED | OUTPATIENT
Start: 2024-12-12 | End: 2024-12-12 | Stop reason: HOSPADM

## 2024-12-12 RX ORDER — BALANCED SALT SOLUTION 6.4; .75; .48; .3; 3.9; 1.7 MG/ML; MG/ML; MG/ML; MG/ML; MG/ML; MG/ML
SOLUTION OPHTHALMIC PRN
Status: DISCONTINUED | OUTPATIENT
Start: 2024-12-12 | End: 2024-12-12 | Stop reason: HOSPADM

## 2024-12-12 RX ORDER — ACETAMINOPHEN 325 MG/1
650 TABLET ORAL EVERY 4 HOURS PRN
Status: DISCONTINUED | OUTPATIENT
Start: 2024-12-12 | End: 2024-12-12 | Stop reason: HOSPADM

## 2024-12-12 RX ORDER — 0.9 % SODIUM CHLORIDE 0.9 %
10 VIAL (ML) INJECTION PRN
Status: DISCONTINUED | OUTPATIENT
Start: 2024-12-12 | End: 2024-12-12 | Stop reason: HOSPADM

## 2024-12-12 RX ORDER — HYDROCODONE BITARTRATE AND ACETAMINOPHEN 5; 325 MG/1; MG/1
1 TABLET ORAL
Status: DISCONTINUED | OUTPATIENT
Start: 2024-12-12 | End: 2024-12-12 | Stop reason: HOSPADM

## 2024-12-12 RX ORDER — PREDNISOLONE ACETATE 10 MG/ML
SUSPENSION/ DROPS OPHTHALMIC PRN
Status: DISCONTINUED | OUTPATIENT
Start: 2024-12-12 | End: 2024-12-12 | Stop reason: HOSPADM

## 2024-12-12 RX ORDER — CHLORHEXIDINE GLUCONATE ORAL RINSE 1.2 MG/ML
15 SOLUTION DENTAL
Status: COMPLETED | OUTPATIENT
Start: 2024-12-12 | End: 2024-12-12

## 2024-12-12 RX ORDER — DEXTROSE MONOHYDRATE 25 G/50ML
INJECTION, SOLUTION INTRAVENOUS PRN
Status: DISCONTINUED | OUTPATIENT
Start: 2024-12-12 | End: 2024-12-12 | Stop reason: HOSPADM

## 2024-12-12 RX ORDER — SODIUM CHLORIDE, SODIUM LACTATE, POTASSIUM CHLORIDE, CALCIUM CHLORIDE 600; 310; 30; 20 MG/100ML; MG/100ML; MG/100ML; MG/100ML
INJECTION, SOLUTION INTRAVENOUS CONTINUOUS
Status: DISCONTINUED | OUTPATIENT
Start: 2024-12-12 | End: 2024-12-12 | Stop reason: HOSPADM

## 2024-12-12 RX ORDER — METOCLOPRAMIDE HYDROCHLORIDE 5 MG/ML
10 INJECTION INTRAMUSCULAR; INTRAVENOUS
Status: COMPLETED | OUTPATIENT
Start: 2024-12-12 | End: 2024-12-12

## 2024-12-12 RX ORDER — BALANCED SALT SOLUTION ENRICHED WITH BICARBONATE, DEXTROSE, AND GLUTATHIONE
KIT INTRAOCULAR PRN
Status: DISCONTINUED | OUTPATIENT
Start: 2024-12-12 | End: 2024-12-12 | Stop reason: HOSPADM

## 2024-12-12 RX ADMIN — FENTANYL CITRATE 25 MCG: 50 INJECTION INTRAMUSCULAR; INTRAVENOUS at 08:21

## 2024-12-12 RX ADMIN — LIDOCAINE HYDROCHLORIDE 50 MG: 10 INJECTION, SOLUTION INFILTRATION; PERINEURAL at 08:22

## 2024-12-12 RX ADMIN — FAMOTIDINE 20 MG: 10 INJECTION INTRAVENOUS at 08:12

## 2024-12-12 RX ADMIN — PROPOFOL 70 MG: 10 INJECTION, EMULSION INTRAVENOUS at 08:22

## 2024-12-12 RX ADMIN — MIDAZOLAM HYDROCHLORIDE 2 MG: 1 INJECTION, SOLUTION INTRAMUSCULAR; INTRAVENOUS at 08:21

## 2024-12-12 RX ADMIN — METOCLOPRAMIDE HYDROCHLORIDE 10 MG: 5 INJECTION INTRAMUSCULAR; INTRAVENOUS at 08:12

## 2024-12-12 RX ADMIN — CHLORHEXIDINE GLUCONATE 15 ML: 1.2 RINSE ORAL at 08:12

## 2024-12-12 RX ADMIN — ACETAMINOPHEN 650 MG: 325 TABLET ORAL at 09:51

## 2024-12-12 RX ADMIN — SODIUM CHLORIDE, POTASSIUM CHLORIDE, SODIUM LACTATE AND CALCIUM CHLORIDE: 600; 310; 30; 20 INJECTION, SOLUTION INTRAVENOUS at 08:22

## 2024-12-12 ASSESSMENT — PAIN SCALES - WONG BAKER: WONGBAKER_NUMERICALRESPONSE: 0

## 2024-12-12 ASSESSMENT — ENCOUNTER SYMPTOMS
SEIZURES: 0
EXERCISE TOLERANCE: GOOD (>4 METS)

## 2024-12-12 ASSESSMENT — PAIN SCALES - GENERAL
PAINLEVEL_OUTOF10: 0
PAINLEVEL_OUTOF10: 0

## (undated) DEVICE — PACK VITRTM 23GA CNSTL TOTAL + ULTRAVIT EDGEPLUS 10000 CPM

## (undated) DEVICE — GLOVE SURG 7.5 PROTEXIS PI PWDR FREE BEAD CUFF PLISPRN L11.8

## (undated) DEVICE — GLOVE SURG 7 PROTEXIS LF BLUE PF SMTH BEAD CUFF INTLK STRL

## (undated) DEVICE — SYRINGE 1ML GRAD STRL MED LF DISP LL

## (undated) DEVICE — DRAPE FL BD 2 FLUID CATCH BAG NOSE BRDG NONWOVEN RECT INCS

## (undated) DEVICE — NEEDLE FLTR BD OD18 GA L1 12 IN 5 UM REG WALL BLUNT BVL LL

## (undated) DEVICE — GLOVE SURG 6.5 PROTEXIS PI PWDR FREE BEAD CUFF PLISPRN L11.3

## (undated) DEVICE — NEEDLE L1 12 IN OD18 GA PP FILL LL HUB DEHP FREE BLUNT BD

## (undated) DEVICE — LENS OPHTHSCP LENZ HD STRL LF DISP

## (undated) DEVICE — SYRINGE 10 ML OD18 GA BLUNT FILL NDL CONC TIP DEHP FREE LOK

## (undated) DEVICE — CANNULA OPHTHALMIC 23GA SHORT SFTP STRL DISP

## (undated) DEVICE — PROBE LSR 2IN 23GA PUREPOINT 78D NTNL PLASTIC GLASS ILUM CRV

## (undated) DEVICE — Device

## (undated) DEVICE — HOLDER TUBE DEVON REMOVABLE TOUCH FASTEN STRAP 3100

## (undated) DEVICE — GOWN SURG XL L3 NONREINFORCE SET IN SLV STRL LF DISP BLUE

## (undated) DEVICE — BLANKET WRM LWR BODY ADULT 60X36IN BR HGR PLMR FT DRAPE ADH

## (undated) DEVICE — TOWEL OR BLU 16X26IN 4PK

## (undated) DEVICE — SCISSORS GRIESHABER REVOLUTION 360 D VRT SHARP TOE SMTH

## (undated) DEVICE — GLOVE SURG 6 PROTEXIS PI PWDR FREE BEAD CUFF PLISPRN L11.3

## (undated) DEVICE — SOLUTION IRR 0.9% BALANCED SALT 15 ML

## (undated) NOTE — ED AVS SNAPSHOT
Efrain Lucas   MRN: [id number]    Department:  [facility] Emergency Department   Date of Visit:  1/3/2019           Disclosure     Insurance plans vary and the physician(s) referred by the ER may not be covered by your plan.  Please contact yo CARE PHYSICIAN AT ONCE OR RETURN IMMEDIATELY TO THE EMERGENCY DEPARTMENT. If you have been prescribed any medication(s), please fill your prescription right away and begin taking the medication(s) as directed.   If you believe that any of the medications